# Patient Record
Sex: FEMALE | Race: WHITE | NOT HISPANIC OR LATINO | Employment: FULL TIME | URBAN - METROPOLITAN AREA
[De-identification: names, ages, dates, MRNs, and addresses within clinical notes are randomized per-mention and may not be internally consistent; named-entity substitution may affect disease eponyms.]

---

## 2020-06-04 ENCOUNTER — OFFICE VISIT (OUTPATIENT)
Dept: ORTHOPEDIC SURGERY | Facility: CLINIC | Age: 60
End: 2020-06-04

## 2020-06-04 VITALS — WEIGHT: 160 LBS | BODY MASS INDEX: 25.71 KG/M2 | TEMPERATURE: 97.8 F | HEIGHT: 66 IN

## 2020-06-04 DIAGNOSIS — M54.2 NECK PAIN: ICD-10-CM

## 2020-06-04 DIAGNOSIS — M25.511 RIGHT SHOULDER PAIN, UNSPECIFIED CHRONICITY: Primary | ICD-10-CM

## 2020-06-04 DIAGNOSIS — IMO0002 BURSITIS/TENDONITIS, SHOULDER: ICD-10-CM

## 2020-06-04 PROCEDURE — 99203 OFFICE O/P NEW LOW 30 MIN: CPT | Performed by: ORTHOPAEDIC SURGERY

## 2020-06-04 PROCEDURE — 73030 X-RAY EXAM OF SHOULDER: CPT | Performed by: ORTHOPAEDIC SURGERY

## 2020-06-04 RX ORDER — CHLORAL HYDRATE 500 MG
CAPSULE ORAL
COMMUNITY

## 2020-06-04 RX ORDER — IBUPROFEN 200 MG
200 TABLET ORAL EVERY 6 HOURS PRN
COMMUNITY

## 2020-06-04 RX ORDER — ACETAMINOPHEN 500 MG
500 TABLET ORAL EVERY 6 HOURS PRN
COMMUNITY

## 2020-06-04 NOTE — PROGRESS NOTES
New Right Shoulder      Patient: Francine Urbano        YOB: 1960    Medical Record Number: [id number]        Chief Complaints: right shoulder pain      History of Present Illness: This is a 60-year-old female who presents complaining of right shoulder pain she is left-hand dominant she is also had some neck issues no history injury change in activity she does have some night pain it was worse at night in the past she feels like she is deftly weaker on that side symptoms are some better however her weakness appears to be worsening.  Symptoms been ongoing about 4 months she is done every aspect of conservative management she could do she is an RN.  Her symptoms are severe constant aching burning worse with driving walking better with rest past medical history is remarkable for anxiety      Allergies:   Allergies   Allergen Reactions   • Reglan [Metoclopramide] Anaphylaxis   • Clindamycin Other (See Comments)       Medications:   Home Medications:  Current Outpatient Medications on File Prior to Visit   Medication Sig   • acetaminophen (TYLENOL) 500 MG tablet Take 500 mg by mouth Every 6 (Six) Hours As Needed for Mild Pain .   • ibuprofen (ADVIL,MOTRIN) 200 MG tablet Take 200 mg by mouth Every 6 (Six) Hours As Needed for Mild Pain .   • Omega-3 1000 MG capsule Take  by mouth.     No current facility-administered medications on file prior to visit.      Current Medications:  Scheduled Meds:  Continuous Infusions:  No current facility-administered medications for this visit.   PRN Meds:.    Past Medical History:   Diagnosis Date   • Anxiety         Past Surgical History:   Procedure Laterality Date   • HYSTERECTOMY     • TONSILLECTOMY          Social History     Occupational History   • Not on file   Tobacco Use   • Smoking status: Never Smoker   • Smokeless tobacco: Never Used   Substance and Sexual Activity   • Alcohol use: Yes     Comment: 2   • Drug use: Defer   • Sexual activity: Defer      Social  "History     Social History Narrative   • Not on file        Family History   Problem Relation Age of Onset   • Cancer Sister         colon   • Cancer Brother         multiple myeloma             Review of Systems: 14 point review of systems are remarkable for the pertinent positives listed in the chart by the patient the remainder negative    Review of Systems      Physical Exam: 60 y.o. female  General Appearance:    Alert, cooperative, in no acute distress                   Vitals:    06/04/20 0930   Temp: 97.8 °F (36.6 °C)   Weight: 72.6 kg (160 lb)   Height: 167.6 cm (66\")   PainSc:   4      Patient is alert and read ×3 no acute distress appears her above-listed at height weight and age.  Affect is normal respiratory rate is normal unlabored. Heart rate regular rate rhythm, sclera, dentition and hearing are normal for the purpose of this exam.    Ortho Exam Physical exam of the right shoulder reveals no overlying skin changes no lymphedema no lymphadenopathy.  The patient can actively flex to 180, abduction is similar external rotation is to 50, internal rotation to the upper lumbar spine.  Rotator cuff strength is 4+ to 5 over 5 with isometric strength testing without symptoms.  The patient has decreased cervical range of motion  no obvious  radicular symptoms and a normal elbow exam.  Patient has good distal pulses she does appear to have some weakness with manual muscle testing appears to be biceps and triceps no overlying skin changes    Procedures          Radiology:   AP, Scapular Y and Axillary Lateral of the right shoulder were ordered/reviewed to evauate shoulder pain.  I have no comparative films these show some acromioclavicular arthritis otherwise no acute bony pathology  Imaging Results (Most Recent)     Procedure Component Value Units Date/Time    XR Shoulder 2+ View Right [[id number]] Resulted:  06/04/20 0713     Updated:  06/04/20 0925    Impression:       Ordering physician's impression is " located in the Encounter Note dated 06/04/20. X-ray performed in the DR room.          Assessment/Plan: Right upper extremity pain right shoulder pain I really think this is coming more from nerve she does have some pronounced weakness plan is to proceed with a nerve conduction test I might get her into Dr. Whitaker's hands following the results of that

## 2020-06-10 ENCOUNTER — TELEPHONE (OUTPATIENT)
Dept: ORTHOPEDIC SURGERY | Facility: CLINIC | Age: 60
End: 2020-06-10

## 2020-10-20 ENCOUNTER — OFFICE VISIT (OUTPATIENT)
Dept: ORTHOPEDIC SURGERY | Facility: CLINIC | Age: 60
End: 2020-10-20

## 2020-10-20 VITALS — TEMPERATURE: 97.3 F | BODY MASS INDEX: 26.36 KG/M2 | HEIGHT: 66 IN | WEIGHT: 164 LBS

## 2020-10-20 DIAGNOSIS — M25.552 LEFT HIP PAIN: Primary | ICD-10-CM

## 2020-10-20 DIAGNOSIS — M54.42 CHRONIC LEFT-SIDED LOW BACK PAIN WITH LEFT-SIDED SCIATICA: ICD-10-CM

## 2020-10-20 DIAGNOSIS — G89.29 CHRONIC LEFT-SIDED LOW BACK PAIN WITH LEFT-SIDED SCIATICA: ICD-10-CM

## 2020-10-20 PROCEDURE — 72100 X-RAY EXAM L-S SPINE 2/3 VWS: CPT | Performed by: ORTHOPAEDIC SURGERY

## 2020-10-20 PROCEDURE — 73502 X-RAY EXAM HIP UNI 2-3 VIEWS: CPT | Performed by: ORTHOPAEDIC SURGERY

## 2020-10-20 PROCEDURE — 99213 OFFICE O/P EST LOW 20 MIN: CPT | Performed by: ORTHOPAEDIC SURGERY

## 2020-10-20 NOTE — PROGRESS NOTES
New Left Hip      Patient: Francine Urbano        YOB: 1960    Medical Record Number: [id number]        Chief Complaints: Left Hip Pain      History of Present Illness: This patient is here complaining of left hip pain she states it is really not hip its buttock and low back she has a long history of low back pain but she states now is low back left hip and radiating down her thigh.  Is been ongoing since May it is worsened it is severe intermittent to constant depending on the day stabbing aching clicking worse with standing sitting driving walking better with ice rest she is seeing a chiropractor for this.  She is an RN past medical history remarkable for anxiety  HPI      Allergies:   Allergies   Allergen Reactions   • Reglan [Metoclopramide] Anaphylaxis   • Clindamycin Other (See Comments)       Medications:   Home Medications:  Current Outpatient Medications on File Prior to Visit   Medication Sig   • acetaminophen (TYLENOL) 500 MG tablet Take 500 mg by mouth Every 6 (Six) Hours As Needed for Mild Pain .   • ibuprofen (ADVIL,MOTRIN) 200 MG tablet Take 200 mg by mouth Every 6 (Six) Hours As Needed for Mild Pain .   • Omega-3 1000 MG capsule Take  by mouth.     No current facility-administered medications on file prior to visit.      Current Medications:  Scheduled Meds:  Continuous Infusions:No current facility-administered medications for this visit.     PRN Meds:.    Past Medical History:   Diagnosis Date   • Anxiety         Past Surgical History:   Procedure Laterality Date   • HYSTERECTOMY     • TONSILLECTOMY          Social History     Occupational History   • Not on file   Tobacco Use   • Smoking status: Never Smoker   • Smokeless tobacco: Never Used   Substance and Sexual Activity   • Alcohol use: Yes     Comment: 2   • Drug use: Defer   • Sexual activity: Defer      Social History     Social History Narrative   • Not on file        Family History   Problem Relation Age of Onset   • Cancer  Sister         colon   • Cancer Brother         multiple myeloma             Review of Systems: 14 point review of systems are remarkable for the pertinent positives listed in chart by the patient the remainder negative  Review of Systems      Physical Exam: 60 y.o. female  General Appearance:    Alert, cooperative, in no acute distress                 There were no vitals filed for this visit.   Patient is alert and read ×3 no acute distress appears her above-listed at height weight and age.  Affect is normal respiratory rate is normal unlabored. Heart rate regular rate rhythm, sclera, dentition and hearing are normal for the purpose of this exam.        Ortho Exam       Hip exam is normal she has full internal/external rotation no dedicated groin pain she does have mildly positive straight leg test and Lasegue's she is neurologically intact guarded many muscle test which is 5/5 sensations intact calf is soft and nontender    Radiology:   AP, Lateral of the left hip was ordered/reviewed to evauateknhip pain. I have no comparative films these show the heads well-seated within the acetabulum perhaps a very mild arthritis but nothing significant AP and lateral lumbar spine were also ordered to evaluate her back I have no comparative films she does have some narrowing of her disc space no obvious listhesis    Assessment/Plan:    Left hip pain I agree with the patient I think this is coming from her back it is radicular in origin its been ongoing since May she is done physical therapy rest anti-inflammatories all with no lasting improvement plan is to proceed with an MRI I will talk to her after that and most likely will get her in to see Dr. Whitaker

## 2020-11-03 DIAGNOSIS — M54.10 ACUTE LOW BACK PAIN WITH RADICULAR SYMPTOMS, DURATION LESS THAN 6 WEEKS: Primary | ICD-10-CM

## 2020-11-18 ENCOUNTER — HOSPITAL ENCOUNTER (OUTPATIENT)
Dept: PHYSICAL THERAPY | Facility: HOSPITAL | Age: 60
Setting detail: THERAPIES SERIES
Discharge: HOME | End: 2020-11-18

## 2020-11-18 DIAGNOSIS — R26.2 DIFFICULTY WALKING: ICD-10-CM

## 2020-11-18 DIAGNOSIS — M54.42 CHRONIC LEFT-SIDED LOW BACK PAIN WITH LEFT-SIDED SCIATICA: Primary | ICD-10-CM

## 2020-11-18 DIAGNOSIS — G89.29 CHRONIC LEFT-SIDED LOW BACK PAIN WITH LEFT-SIDED SCIATICA: Primary | ICD-10-CM

## 2020-11-18 PROCEDURE — 97110 THERAPEUTIC EXERCISES: CPT

## 2020-11-18 PROCEDURE — 97162 PT EVAL MOD COMPLEX 30 MIN: CPT

## 2020-11-18 NOTE — THERAPY EVALUATION
"    Outpatient Physical Therapy Ortho Initial Evaluation  [facility]     Patient Name: Francine Urbano  : 1960  MRN: [id number]  Today's Date: 2020      Visit Date: 2020    There is no problem list on file for this patient.       Past Medical History:   Diagnosis Date   • Anxiety         Past Surgical History:   Procedure Laterality Date   • HYSTERECTOMY     • TONSILLECTOMY         Visit Dx:     ICD-10-CM ICD-9-CM   1. Chronic left-sided low back pain with left-sided sciatica  M54.42 724.2    G89.29 724.3     338.29   2. Difficulty walking  R26.2 719.7         Patient History     Row Name 20 1100             History    Chief Complaint  Pain  -CA      Type of Pain  Back pain;Lower Extremity / Leg  -CA      Brief Description of Current Complaint  Francine Urbano is a 60 y.o. female who presents today with low back pain radiating down front of L LE, began insidiously in January, but exacerbated with fall in July of this year. Pt saw Dr. Workman for LBP approximately 1 month ago, with referral to Dr. Whitaker in Dec of this year. MRI remarkable for multilevel degenerative disc disease, facet arthrosis, disc bulge at L 2-3 and L 3-4, and canal stenosis. Pt reports increased pain with standing and walking. Pt reports limited to approximately 10 steps of walking before pain increases. Usually sitting \"not too bad but does cramp on some days\". Pt reports improve pain with laying flat on back and pelvic tilts, and heat. Pt has also had 2 epidurals with no success. Pt also reports she has been seeing a chiropractor who \"put me on my belly and did some work and I could barely walk out of there\". Discussed maybe focusing on physical therapy sessions only for a while in order to focus on one provider at a time to see what helps, but pt reports she switched chiropractors and \"this one is better\". PMH cervical stenosis (helped by injections and managed with HEP).  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  X-ray;MRI  -CA      Results of Clinical Tests  MRI remarkable for multilevel degenerative disc disease, facet arthrosis, disc bulge at L 2-3 and L 3-4, and canal stenosis.  -CA         Pain     Pain Location  Back  -CA      Pain at Present  3  -CA      Pain at Best  0 laying flat on back  -CA      Pain at Worst  6  -CA      Is your sleep disturbed?  Yes  -CA         Fall Risk Assessment    Any falls in the past year:  Yes  -CA      Number of falls reported in the last 12 months  3 fell down steps, fell up steps, fell off bench  -CA      Factors that contributed to the fall:  Other (comment);Tripped steps due to bifocals and tripped on cat; bench tipped over  -CA         Daily Activities    Primary Language  English  -CA      Are you able to read  Yes  -CA      Are you able to write  Yes  -CA      Barriers to learning  None  -CA      Pt Participated in POC and Goals  Yes  -CA         Safety    Are you being hurt, hit, or frightened by anyone at home or in your life?  No  -CA      Are you being neglected by a caregiver  No  -CA      Have you had any of the following issues with  N/A  -CA        User Key  (r) = Recorded By, (t) = Taken By, (c) = Cosigned By    Initials Name Provider Type    CA Tracy Deleon PT Physical Therapist          PT Ortho     Row Name 11/18/20 1100       Posture/Observations    Posture/Observations Comments  mild fwd head and rounded shoulders  -CA       Neural Tension Signs- Lower Quarter Clearing    Slump  Negative  -CA    SLR  Negative  -CA    Prone knee flexion  Left:;Positive  -CA       Myotomal Screen- Lower Quarter Clearing    Hip flexion (L2)  Bilateral:;4- (Good -)  -CA    Knee extension (L3)  Bilateral:;5 (Normal)  -CA    Ankle DF (L4)  Bilateral:;5 (Normal)  -CA    Knee flexion (S2)  Left:;4+ (Good +)  -CA       Lumbar ROM Screen- Lower Quarter Clearing    Lumbar Flexion   Normal  -CA    Lumbar Extension  Impaired 25% ROM, most ext coming from thoracic, limited lumbar  -CA    Lumbar Lateral Flexion  Impaired 15% ROM, minimal rot from lumbar spine  -CA    Lumbar Rotation  Impaired mid thigh - stretch on R QL  -CA       SI/Hip Screen- Lower Quarter Clearing    ASIS compression  Negative  -CA    ASIS distraction  Negative  -CA    Yimi's/Chino's test  Negative  -CA       Lumbar/SI Special Tests    Lumbar/SI Special Tests Comments  prone incr pain, HL decr pain, decreased pain with hip flex PROM  -CA       Lumbosacral Palpation    SI  Bilateral:;Tender  -CA    Lumbosacral Segment  Bilateral:;Tender  -CA    Gluteus Mark  Bilateral:;Tender  -CA    Quadratus Lumborum  Bilateral:;Tender;Guarded/taut  -CA       General ROM    GENERAL ROM COMMENTS  R hip IR > L hip IR  -CA       MMT Right Lower Ext    Rt Hip ABduction MMT, Gross Movement  (4-/5) good minus  -CA       MMT Left Lower Ext    Lt Hip ABduction MMT, Gross Movement  (3+/5) fair plus  -CA       Sensation    Sensation WNL?  WNL  -CA       Lower Extremity Flexibility    Hamstrings  Bilateral:;Mildly limited  -CA    Hip Flexors  Left:;Mildly limited  -CA    Quadriceps  Left:;Mildly limited  -CA    Hip External Rotators  WNL  -CA      User Key  (r) = Recorded By, (t) = Taken By, (c) = Cosigned By    Initials Name Provider Type    Tracy Baig, PT Physical Therapist                      Therapy Education  Education Details: Access code: [email or web address]; eval findings, POC, anatomy  Given: HEP, Symptoms/condition management, Pain management, Posture/body mechanics  Program: New  How Provided: Verbal, Demonstration, Written  Provided to: Patient  Level of Understanding: Teach back education performed, Verbalized, Demonstrated     PT OP Goals     Row Name 11/18/20 1400          PT Short Term Goals    STG Date to Achieve  12/16/20  -CA     STG 1  Patient will be independent with education for symptom management and initial HEP to decrease  LBP pain.  -CA     STG 1 Progress  New  -CA     STG 2  Pt will improve B LE strength to at least 4+/5.  -CA     STG 2 Progress  New  -CA     STG 3  Patient will improve ability to sleep through the night without sleep disturbances related to back pain to improve overall sleep quality and quality of life.  -CA     STG 3 Progress  New  -CA        Long Term Goals    LTG Date to Achieve  01/13/21  -CA     LTG 1  Patient will be independent with education for symptom management and advanced HEP to decrease LBP pain.  -CA     LTG 1 Progress  New  -CA     LTG 2  Patient will reduce level of percieved disability as measured by the Modified Oswestry  from 60% disability to </= 30% disability in order to improve quality of life.  -CA     LTG 2 Progress  New  -CA     LTG 3  Patient will improve walking tolerance from 2 min to >30 min with </=2/10 LBP to improve participation in community activities.  -CA     LTG 3 Progress  New  -CA     LTG 4  Pt will report return to Yoga activities with </=2/10 LBP to improve community engagement.  -CA     LTG 4 Progress  New  -CA       User Key  (r) = Recorded By, (t) = Taken By, (c) = Cosigned By    Initials Name Provider Type    Tracy Baig, PT Physical Therapist          PT Assessment/Plan     Row Name 11/18/20 1400          PT Assessment    Functional Limitations  Impaired gait;Limitation in home management;Limitations in community activities;Limitations in functional capacity and performance;Performance in leisure activities;Performance in self-care ADL  -CA     Impairments  Impaired flexibility;Joint mobility;Muscle strength;Pain;Poor body mechanics;Posture;Range of motion  -CA     Assessment Comments  Francine Urbano is a 60 y.o. female referred to physical therapy for LBP. She presents with an evolving clinical presentation, along with  comorbidities of cervical stenosis and personal factors of trying multiple providers including chiropractor at the same time and can only  attend therapy 1x/week due to caring for her son. that may impact her progress in the plan of care. Pt presents today with decreased lumbar ROM, (+) prone knee bend test, decreased B HS/QL and L hip flex/quad flexibility, core/B hip weakness (L>R), and pain with lumbar extension. her signs and symptoms are consistent with referring diagnosis. The previous impairments limit her ability to walk, stand, work as RN, sleep through the night without pain, and participate in recreational walking and yoga. Pt will benefit from skilled PT to address the previous impairments and return to OF.  -CA     Please refer to paper survey for additional self-reported information  Yes  -CA     Rehab Potential  Fair  -CA     Patient/caregiver participated in establishment of treatment plan and goals  Yes  -CA     Patient would benefit from skilled therapy intervention  Yes  -CA        PT Plan    PT Frequency  1x/week  -CA     Predicted Duration of Therapy Intervention (PT)  4-6 weeks  -CA     Planned CPT's?  PT EVAL MOD COMPLELITY: 99826;PT RE-EVAL: 62657;PT THER PROC EA 15 MIN: 42805;PT THER ACT EA 15 MIN: 70800;PT MANUAL THERAPY EA 15 MIN: 17429;PT NEUROMUSC RE-EDUCATION EA 15 MIN: 03302;PT SELF CARE/HOME MGMT/TRAIN EA 15: 67959;PT HOT OR COLD PACK TREAT MCARE;PT ELECTRICAL STIM UNATTEND:   -CA     PT Plan Comments  PT POC includes strengthening/flexibility/ROM, manual therapy, modalities as needed. Next visit, start on nu step, review HEP, consider seated HS str, supine hip flexor str, hip flexor strengthening, core strengthening, SL clam, hip add, DKTC on swiss ball. Femoral n. flossing.  -CA       User Key  (r) = Recorded By, (t) = Taken By, (c) = Cosigned By    Initials Name Provider Type    Tracy Baig, PT Physical Therapist            OP Exercises     Row Name 11/18/20 1200             Total Minutes    95154 - PT Therapeutic Exercise Minutes  15  -CA         Exercise 1    Exercise Name 1  PPT  -CA      Cueing 1   Verbal  -CA      Reps 1  10  -CA      Time 1  5s  -CA         Exercise 2    Exercise Name 2  SKTC  -CA      Cueing 2  Verbal  -CA      Reps 2  10 B  -CA      Time 2  10s  -CA         Exercise 3    Exercise Name 3  HL hip abd  -CA      Cueing 3  Verbal  -CA      Sets 3  2  -CA      Reps 3  10  -CA      Time 3  GTB  -CA        User Key  (r) = Recorded By, (t) = Taken By, (c) = Cosigned By    Initials Name Provider Type    Tracy Baig, PT Physical Therapist                        Outcome Measure Options: Modifed Owestry  Modified Oswestry  Modified Oswestry Score/Comments: 30/50 = 60% disability      Time Calculation:     Start Time: 1130  Stop Time: 1215  Time Calculation (min): 45 min  Total Timed Code Minutes- PT: 15 minute(s)     Therapy Charges for Today     Code Description Service Date Service Provider Modifiers Qty    28574103208  PT THER PROC EA 15 MIN 11/18/2020 Tracy Deleno, PT GP 1    78089085761 HC PT EVAL MOD COMPLEXITY 2 11/18/2020 Tracy Deleon, PT GP 1          PT G-Codes  Outcome Measure Options: Modifed Owestry  Modified Oswestry Score/Comments: 30/50 = 60% disability         Tracy Deleon, MAKENZIE  11/18/2020

## 2020-12-02 ENCOUNTER — HOSPITAL ENCOUNTER (OUTPATIENT)
Dept: PHYSICAL THERAPY | Facility: HOSPITAL | Age: 60
Setting detail: THERAPIES SERIES
Discharge: HOME | End: 2020-12-02

## 2020-12-02 DIAGNOSIS — G89.29 CHRONIC LEFT-SIDED LOW BACK PAIN WITH LEFT-SIDED SCIATICA: Primary | ICD-10-CM

## 2020-12-02 DIAGNOSIS — M54.42 CHRONIC LEFT-SIDED LOW BACK PAIN WITH LEFT-SIDED SCIATICA: Primary | ICD-10-CM

## 2020-12-02 DIAGNOSIS — R26.2 DIFFICULTY WALKING: ICD-10-CM

## 2020-12-02 PROCEDURE — 97110 THERAPEUTIC EXERCISES: CPT

## 2020-12-02 NOTE — THERAPY TREATMENT NOTE
"    Outpatient Physical Therapy Ortho Treatment Note  [facility]     Patient Name: Francine Urbano  : 1960  MRN: [id number]  Today's Date: 2020      Visit Date: 2020    Visit Dx:    ICD-10-CM ICD-9-CM   1. Chronic left-sided low back pain with left-sided sciatica  M54.42 724.2    G89.29 724.3     338.29   2. Difficulty walking  R26.2 719.7       There is no problem list on file for this patient.       Past Medical History:   Diagnosis Date   • Anxiety         Past Surgical History:   Procedure Laterality Date   • HYSTERECTOMY     • TONSILLECTOMY                         PT Assessment/Plan     Row Name 20 1200          PT Assessment    Assessment Comments  Francine returns for first f/u reporting improved pain due to not really doing anything. Walking tends to increase pain, and pt has not been walking much recently. Poor HEP compliance d/t son in hospital. Progressed and updated HEP today, no pain at end of session.  -CA        PT Plan    PT Plan Comments  Next visit: SL marleny, QL str, swiss ball roll out, TA with march, dead bug  -CA       User Key  (r) = Recorded By, (t) = Taken By, (c) = Cosigned By    Initials Name Provider Type    Tracy Baig, PT Physical Therapist            OP Exercises     Row Name 20 1200             Subjective Comments    Subjective Comments  Doing a lot better because I haven't been doing anything. My son is in the hospital so I haven't had much time to do the exercises.  -CA         Subjective Pain    Able to rate subjective pain?  yes  -CA      Pre-Treatment Pain Level  1  -CA      Subjective Pain Comment  \"cramping in the right side of my back\"  -CA         Total Minutes    52164 - PT Therapeutic Exercise Minutes  40  -CA         Exercise 1    Exercise Name 1  PPT  -CA      Cueing 1  Verbal  -CA      Reps 1  10  -CA      Time 1  5s  -CA         Exercise 2    Exercise Name 2  SKTC  -CA      Cueing 2  Verbal  -CA      Reps 2  10 B  -CA      Time 2  10s  " -CA         Exercise 3    Exercise Name 3  HL hip abd  -CA      Cueing 3  Verbal  -CA      Reps 3  15  -CA      Time 3  BTB  -CA      Additional Comments  B and uni  -CA         Exercise 4    Exercise Name 4  nu step  -CA      Cueing 4  Verbal  -CA      Time 4  5 min  -CA      Additional Comments  lvl 5 UE/LE  -CA         Exercise 5    Exercise Name 5  B HS str seated  -CA      Cueing 5  Verbal  -CA      Reps 5  3  -CA      Time 5  20 s  -CA         Exercise 6    Exercise Name 6  B SL femoral n. glide  -CA      Cueing 6  Verbal  -CA      Reps 6  15 ea leg  -CA         Exercise 7    Exercise Name 7  SL thoracic arc  -CA      Cueing 7  Verbal  -CA      Reps 7  10 B  -CA      Time 7  5 sec  -CA        User Key  (r) = Recorded By, (t) = Taken By, (c) = Cosigned By    Initials Name Provider Type    Tracy Baig, PT Physical Therapist                       PT OP Goals     Row Name 12/02/20 1300          PT Short Term Goals    STG Date to Achieve  12/16/20  -CA     STG 1  Patient will be independent with education for symptom management and initial HEP to decrease LBP pain.  -CA     STG 1 Progress  Ongoing  -CA     STG 2  Pt will improve B LE strength to at least 4+/5.  -CA     STG 2 Progress  Ongoing  -CA     STG 3  Patient will improve ability to sleep through the night without sleep disturbances related to back pain to improve overall sleep quality and quality of life.  -CA     STG 3 Progress  Ongoing  -CA        Long Term Goals    LTG Date to Achieve  01/13/21  -CA     LTG 1  Patient will be independent with education for symptom management and advanced HEP to decrease LBP pain.  -CA     LTG 1 Progress  Ongoing  -CA     LTG 2  Patient will reduce level of percieved disability as measured by the Modified Oswestry  from 60% disability to </= 30% disability in order to improve quality of life.  -CA     LTG 2 Progress  Ongoing  -CA     LTG 3  Patient will improve walking tolerance from 2 min to >30 min with </=2/10  LBP to improve participation in community activities.  -CA     LTG 3 Progress  Ongoing  -CA     LTG 4  Pt will report return to Yoga activities with </=2/10 LBP to improve community engagement.  -CA     LTG 4 Progress  Ongoing  -CA       User Key  (r) = Recorded By, (t) = Taken By, (c) = Cosigned By    Initials Name Provider Type    Tracy Baig, PT Physical Therapist          Therapy Education  Education Details: Access code: [email or web address]  Given: HEP  Program: Reinforced, Progressed  How Provided: Verbal, Demonstration, Written  Provided to: Patient  Level of Understanding: Teach back education performed, Verbalized, Demonstrated              Time Calculation:   Start Time: 1215  Stop Time: 1255  Time Calculation (min): 40 min  Total Timed Code Minutes- PT: 40 minute(s)  Therapy Charges for Today     Code Description Service Date Service Provider Modifiers Qty    39084613448 HC PT THER PROC EA 15 MIN 12/2/2020 Tracy Deleon, PT GP 3                    Tracy Deleon, PT  12/2/2020

## 2020-12-09 ENCOUNTER — APPOINTMENT (OUTPATIENT)
Dept: PHYSICAL THERAPY | Facility: HOSPITAL | Age: 60
End: 2020-12-09

## 2020-12-16 ENCOUNTER — HOSPITAL ENCOUNTER (OUTPATIENT)
Dept: PHYSICAL THERAPY | Facility: HOSPITAL | Age: 60
Setting detail: THERAPIES SERIES
Discharge: HOME | End: 2020-12-16

## 2020-12-16 DIAGNOSIS — R26.2 DIFFICULTY WALKING: ICD-10-CM

## 2020-12-16 DIAGNOSIS — M54.42 CHRONIC LEFT-SIDED LOW BACK PAIN WITH LEFT-SIDED SCIATICA: Primary | ICD-10-CM

## 2020-12-16 DIAGNOSIS — G89.29 CHRONIC LEFT-SIDED LOW BACK PAIN WITH LEFT-SIDED SCIATICA: Primary | ICD-10-CM

## 2020-12-16 PROCEDURE — 97110 THERAPEUTIC EXERCISES: CPT

## 2020-12-16 NOTE — THERAPY TREATMENT NOTE
Outpatient Physical Therapy Ortho Treatment Note  [facility]     Patient Name: Francine Urbano  : 1960  MRN: [id number]  Today's Date: 2020      Visit Date: 2020    Visit Dx:    ICD-10-CM ICD-9-CM   1. Chronic left-sided low back pain with left-sided sciatica  M54.42 724.2    G89.29 724.3     338.29   2. Difficulty walking  R26.2 719.7       There is no problem list on file for this patient.       Past Medical History:   Diagnosis Date   • Anxiety         Past Surgical History:   Procedure Laterality Date   • HYSTERECTOMY     • TONSILLECTOMY                         PT Assessment/Plan     Row Name 20 1500          PT Assessment    Assessment Comments  Francine continues to show improved pain and walking. Reports improved pain with massage today prior to therapy. Limited progression today due to late arrival.  -CA        PT Plan    PT Plan Comments  Next visit: QL str, swiss ball roll out, dead bug. Update HEP, discuss continuation of therapy vs. DC to HEP.  -CA       User Key  (r) = Recorded By, (t) = Taken By, (c) = Cosigned By    Initials Name Provider Type    Tracy Baig, PT Physical Therapist            OP Exercises     Row Name 20 1400             Subjective Comments    Subjective Comments  Pt arrived 10 minutes late to today's appointment. I'm doing a lot better, I was actually able to walk without pain, which I haven't done in a long time. I had a massage before this so I'm feeling good.  -CA         Subjective Pain    Able to rate subjective pain?  yes  -CA      Pre-Treatment Pain Level  0  -CA         Total Minutes    65544 - PT Therapeutic Exercise Minutes  30  -CA         Exercise 1    Exercise Name 1  PPT with march  -CA      Cueing 1  Verbal  -CA      Sets 1  2  -CA      Reps 1  10  -CA         Exercise 2    Exercise Name 2  SKTC  -CA      Cueing 2  Verbal  -CA      Reps 2  5B  -CA      Time 2  10s  -CA         Exercise 3    Exercise Name 3  B SL clam  -CA       Cueing 3  Verbal  -CA      Sets 3  2  -CA      Reps 3  10  -CA      Time 3  RTB  -CA         Exercise 4    Exercise Name 4  nu step  -CA      Cueing 4  Verbal  -CA      Time 4  5 min  -CA      Additional Comments  lvl 5 UE/LE  -CA         Exercise 5    Exercise Name 5  B HS str stair  -CA      Cueing 5  Verbal  -CA      Reps 5  3  -CA      Time 5  20 s  -CA         Exercise 6    Exercise Name 6  B supine hip flex/quad str with strap off table  -CA      Cueing 6  Verbal  -CA      Reps 6  3  -CA      Time 6  20s  -CA         Exercise 7    Exercise Name 7  SL thoracic arc  -CA      Cueing 7  Verbal  -CA      Reps 7  10 B  -CA      Time 7  5 sec  -CA        User Key  (r) = Recorded By, (t) = Taken By, (c) = Cosigned By    Initials Name Provider Type    Tracy Baig, PT Physical Therapist                                          Time Calculation:   Start Time: 1455  Stop Time: 1525  Time Calculation (min): 30 min  Total Timed Code Minutes- PT: 30 minute(s)  Therapy Charges for Today     Code Description Service Date Service Provider Modifiers Qty    21174114431 HC PT THER PROC EA 15 MIN 12/16/2020 Tracy Deleon, PT GP 2                    Tracy Deleon, PT  12/16/2020

## 2020-12-22 ENCOUNTER — APPOINTMENT (OUTPATIENT)
Dept: PHYSICAL THERAPY | Facility: HOSPITAL | Age: 60
End: 2020-12-22

## 2021-01-13 ENCOUNTER — OFFICE VISIT (OUTPATIENT)
Dept: ORTHOPEDIC SURGERY | Facility: CLINIC | Age: 61
End: 2021-01-13

## 2021-01-13 VITALS — BODY MASS INDEX: 26.36 KG/M2 | TEMPERATURE: 97.6 F | HEIGHT: 66 IN | WEIGHT: 164.02 LBS

## 2021-01-13 DIAGNOSIS — M43.16 SPONDYLOLISTHESIS OF LUMBAR REGION: Primary | ICD-10-CM

## 2021-01-13 DIAGNOSIS — M48.062 SPINAL STENOSIS OF LUMBAR REGION WITH NEUROGENIC CLAUDICATION: ICD-10-CM

## 2021-01-13 PROCEDURE — 99214 OFFICE O/P EST MOD 30 MIN: CPT | Performed by: ORTHOPAEDIC SURGERY

## 2021-01-13 RX ORDER — ESCITALOPRAM OXALATE 20 MG/1
20 TABLET ORAL DAILY
COMMUNITY
Start: 2021-01-04

## 2021-01-13 RX ORDER — HYDROXYZINE 50 MG/1
TABLET, FILM COATED ORAL
COMMUNITY
Start: 2021-01-04

## 2021-01-13 RX ORDER — DIPHENHYDRAMINE HCL 12.5MG/5ML
12.5 LIQUID (ML) ORAL DAILY
COMMUNITY

## 2021-01-13 NOTE — PROGRESS NOTES
New patient or new problem visit    Chief Complaint   Patient presents with   • Lumbar Spine - Establish Care, Pain       HPI: She complains of back pain radiating to the left thigh trochanter to the knee also notes a mass or fatty tumor over the right pelvis.  She had physical therapy epidural injections which did not help chiropractic and massage which have not helped either pain is 5 out of a 10 usually stabbing aching burning worse with activity.  Ibuprofen and Tylenol helps somewhat.  She is busy socially right now caring for her son with severe ulcerative colitis.    PFSH: See chart- reviewed    Review of Systems   Constitutional: Positive for activity change and appetite change. Negative for chills, fever and unexpected weight change.   HENT: Negative for trouble swallowing and voice change.    Eyes: Negative for visual disturbance.   Respiratory: Negative for cough and shortness of breath.    Cardiovascular: Negative for chest pain and leg swelling.   Gastrointestinal: Negative for abdominal pain, nausea and vomiting.   Endocrine: Negative for cold intolerance and heat intolerance.   Genitourinary: Negative for difficulty urinating, frequency and urgency.   Skin: Negative for rash and wound.   Allergic/Immunologic: Negative for immunocompromised state.   Neurological: Negative for weakness and numbness.   Hematological: Does not bruise/bleed easily.   Psychiatric/Behavioral: Negative for dysphoric mood. The patient is not nervous/anxious.        PE: Constitutional: Vital signs above-noted.  Awake, alert and oriented    Psychiatric: Affect and insight do not appear grossly disturbed.    Pulmonary: Breathing is unlabored, color is good.    Skin: Warm, dry and normal turgor    Cardiac: Pedal pulses intact.  No edema.    Eyesight and hearing appear adequate for examination purposes      Musculoskeletal:  There is mild tenderness to percussion and palpation of the spine. Motion appears undisturbed.  Posture is  unremarkable to coronal and sagittal inspection.    The skin about the area is intact.  There is a palpable swelling in the right posterior inferior iliac spine area which is likely lipoma as it is mobile, ping-pong ball sized and nontender or visible deformity.  There is no local spasm.       Neurologic:   Reflexes are 2+ and symmetrical in the patellae and I did not test the Achilles.   Motor function is undisturbed in quadriceps, EHL, and gastrocnemius   sensation appears symmetrically intact to light touch.  In the bilateral lower extremities there is no evidence of atrophy.   Clonus is absent..  Gait appears undisturbed.  SLR test negative      MEDICAL DECISION MAKING    XRAY: Plain film x-rays of the lumbar spine showed slight scoliosis, multilevel spondylosis.  No comparison views are available.  MRI showed severe stenosis at 3 4 and 4 5 and a spondylolisthesis at 3 4 as well.  Moderate stenotic changes at 5 1 and lesser changes at 2 3 above.    Other: n/a    Impression: Multilevel lumbar spinal stenosis L3-4 spondylolisthesis    Plan: For now as she is busy taking care of her son I am recommending that she hold off of a Dosepak she is not ready for surgery she does not want more epidurals as they did not help.  I am giving her hydrocodone to take sparingly and I told her I did tell her if she was taking too much but I envision 2 or 3 a week at strategic times and Advil at others.  I will see her back in a couple of months.  She is going to need a multilevel decompression and fusion at some point and may need further imaging by then.

## 2021-03-04 ENCOUNTER — TELEPHONE (OUTPATIENT)
Dept: ORTHOPEDIC SURGERY | Facility: CLINIC | Age: 61
End: 2021-03-04

## 2021-03-04 DIAGNOSIS — M48.062 LUMBAR STENOSIS WITH NEUROGENIC CLAUDICATION: ICD-10-CM

## 2021-03-04 NOTE — TELEPHONE ENCOUNTER
Provider:     Caller: PATIENT    Relationship to Patient: SELF    PHARMACY- [phone] [facility]     Phone Number: [phone]    Reason for Call: PT. STATES THAT SHE HAS SEEN DR. SAMUEL ONE TIME. SHE NOTIFIED HIM THAT SHE FELL A FEW WEEKS AGO, THEN HE ADVISED HER TO BE ON BED REST. THIS HAS BEEN 4-5 WEEKS AGO. PT. STATES THAT SHE WENT TO [facility] JUST FOR XRAY. PT. STATES THAT XRAY SHOWED T12 COMPRESSION FRACTURE.   SHE STATES THAT DR. SAMUEL HAD PREVIOUSLY GIVEN HER  HYDROCODONE 5-325 MG. IN January. SHE IS OUT OF THIS NOW, AND IS ASKING IF  WILL SEND IN MORE HYDROCODONE. .  PLEASE CALL TO ADVISE.

## 2021-03-05 NOTE — TELEPHONE ENCOUNTER
Kathe, Can you make her an appt to see [facility] week after next.      Jada, What about refill.  [facility] last filled it January 2021

## 2021-03-09 RX ORDER — HYDROCODONE BITARTRATE AND ACETAMINOPHEN 5; 325 MG/1; MG/1
TABLET ORAL
Qty: 30 TABLET | Refills: 0 | Status: SHIPPED | OUTPATIENT
Start: 2021-03-09 | End: 2021-05-07 | Stop reason: SDUPTHER

## 2021-05-07 ENCOUNTER — OFFICE VISIT (OUTPATIENT)
Dept: ORTHOPEDIC SURGERY | Facility: CLINIC | Age: 61
End: 2021-05-07

## 2021-05-07 VITALS — WEIGHT: 164.02 LBS | BODY MASS INDEX: 26.36 KG/M2 | TEMPERATURE: 96.2 F | HEIGHT: 66 IN

## 2021-05-07 DIAGNOSIS — M48.062 SPINAL STENOSIS OF LUMBAR REGION WITH NEUROGENIC CLAUDICATION: ICD-10-CM

## 2021-05-07 DIAGNOSIS — M48.062 LUMBAR STENOSIS WITH NEUROGENIC CLAUDICATION: ICD-10-CM

## 2021-05-07 DIAGNOSIS — R52 PAIN: Primary | ICD-10-CM

## 2021-05-07 DIAGNOSIS — M43.16 SPONDYLOLISTHESIS OF LUMBAR REGION: ICD-10-CM

## 2021-05-07 PROCEDURE — 99213 OFFICE O/P EST LOW 20 MIN: CPT | Performed by: ORTHOPAEDIC SURGERY

## 2021-05-07 PROCEDURE — 72080 X-RAY EXAM THORACOLMB 2/> VW: CPT | Performed by: ORTHOPAEDIC SURGERY

## 2021-05-07 RX ORDER — HYDROCODONE BITARTRATE AND ACETAMINOPHEN 5; 325 MG/1; MG/1
TABLET ORAL
Qty: 30 TABLET | Refills: 0 | Status: SHIPPED | OUTPATIENT
Start: 2021-05-07

## 2021-05-07 NOTE — PROGRESS NOTES
She is having continued back and leg pain also some neck pain that have not been treating.  She fell 2 months ago was seen by a chiropractor who told her she fractured T12.  He held off manipulating her back for 8 weeks on account of the is concern for this finding.  She has no new complaints but still with back and leg pain to the extent that she has requested hydrocodone which I told her I will refill once more but we may need to get her primary care doctor pain management physician to refill this in the future.  I did not reexamine her today.  Did review her images and she does have severe stenosis at 3 4 and 4 5 and spondylolisthesis at the 3 4 level with changes above and below that might need to be taken into consideration as well.  She is not ready for surgery and is busy taking care of a dependent son with a medical illness.  She is applying for some type of disability and ask if I would help in this regard.  I certainly think she has a significant spinal stenosis and spondylolisthesis and will at some point likely require multilevel decompression and fusion.  I told her I want to get involved in the bureaucratic aspects of disability claim but that she is welcome to the office notes.  It is certainly not unreasonable for her to avoid any occupation involving heavy lifting twisting bending prolonged standing or sitting without a rest or repetitive or heavy overhead work.